# Patient Record
Sex: MALE | URBAN - METROPOLITAN AREA
[De-identification: names, ages, dates, MRNs, and addresses within clinical notes are randomized per-mention and may not be internally consistent; named-entity substitution may affect disease eponyms.]

---

## 2020-09-24 ENCOUNTER — OFFICE VISIT (OUTPATIENT)
Dept: URBAN - METROPOLITAN AREA CLINIC 72 | Facility: CLINIC | Age: 30
End: 2020-09-24

## 2020-09-24 PROCEDURE — 99202 OFFICE O/P NEW SF 15 MIN: CPT | Performed by: OPTOMETRIST

## 2020-09-24 RX ORDER — PREDNISOLONE ACETATE 10 MG/ML
1 % SUSPENSION/ DROPS OPHTHALMIC
Qty: 1 | Refills: 2 | Status: ACTIVE
Start: 2020-09-24

## 2020-09-24 RX ORDER — VALACYCLOVIR 1 G/1
TABLET, FILM COATED ORAL
Qty: 30 | Refills: 6 | Status: ACTIVE
Start: 2020-09-24

## 2020-09-24 ASSESSMENT — INTRAOCULAR PRESSURE
OD: 17
OS: 20

## 2020-09-24 NOTE — IMPRESSION/PLAN
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 Recommend start valacyclovir one tablet PO once a day. start pred one drop q1hour for today then [id number] tomorrow then 6 drops a day until seen next week. Plan: OD: Discussed diagnosis in detail with patient. Discussed treatment option with patient. will follow up in 1 week for R/C OD zoster keratitis.

## 2020-10-02 ENCOUNTER — OFFICE VISIT (OUTPATIENT)
Dept: URBAN - METROPOLITAN AREA CLINIC 72 | Facility: CLINIC | Age: 30
End: 2020-10-02

## 2020-10-02 DIAGNOSIS — B02.33 ZOSTER KERATITIS: Primary | ICD-10-CM

## 2020-10-02 PROCEDURE — 99213 OFFICE O/P EST LOW 20 MIN: CPT | Performed by: OPTOMETRIST

## 2020-10-02 NOTE — IMPRESSION/PLAN
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 - IMPROVING - All signs and symptoms improving but with subepithelial haze Plan: Due to subepithelial haze persisting rec continued high dose of Pred gtts at QID OD for now. Return in 1 week.  If haze cleared then will initiate taper